# Patient Record
Sex: FEMALE | Race: WHITE
[De-identification: names, ages, dates, MRNs, and addresses within clinical notes are randomized per-mention and may not be internally consistent; named-entity substitution may affect disease eponyms.]

---

## 2019-09-11 ENCOUNTER — HOSPITAL ENCOUNTER (INPATIENT)
Dept: HOSPITAL 60 - LB.MS | Age: 84
LOS: 1 days | Discharge: OTHER INSTITUTION | DRG: 951 | End: 2019-09-12
Attending: FAMILY MEDICINE | Admitting: FAMILY MEDICINE
Payer: COMMERCIAL

## 2019-09-11 VITALS — HEART RATE: 81 BPM | DIASTOLIC BLOOD PRESSURE: 73 MMHG | SYSTOLIC BLOOD PRESSURE: 142 MMHG

## 2019-09-11 DIAGNOSIS — H54.8: ICD-10-CM

## 2019-09-11 DIAGNOSIS — Z79.82: ICD-10-CM

## 2019-09-11 DIAGNOSIS — Z98.49: ICD-10-CM

## 2019-09-11 DIAGNOSIS — M54.9: ICD-10-CM

## 2019-09-11 DIAGNOSIS — Z99.81: ICD-10-CM

## 2019-09-11 DIAGNOSIS — E78.00: ICD-10-CM

## 2019-09-11 DIAGNOSIS — K21.9: ICD-10-CM

## 2019-09-11 DIAGNOSIS — Z79.899: ICD-10-CM

## 2019-09-11 DIAGNOSIS — I10: ICD-10-CM

## 2019-09-11 DIAGNOSIS — Z98.890: ICD-10-CM

## 2019-09-11 DIAGNOSIS — M19.90: ICD-10-CM

## 2019-09-11 DIAGNOSIS — Z87.440: ICD-10-CM

## 2019-09-11 DIAGNOSIS — Z79.51: ICD-10-CM

## 2019-09-11 DIAGNOSIS — J44.9: ICD-10-CM

## 2019-09-11 DIAGNOSIS — Z88.0: ICD-10-CM

## 2019-09-11 DIAGNOSIS — Z41.8: Primary | ICD-10-CM

## 2019-09-11 DIAGNOSIS — G89.29: ICD-10-CM

## 2019-09-11 RX ADMIN — ACETAMINOPHEN SCH MG: 650 TABLET, FILM COATED, EXTENDED RELEASE ORAL at 19:57

## 2019-09-11 RX ADMIN — ALOGLIPTIN SCH EACH: 25 TABLET, FILM COATED ORAL at 19:57

## 2019-09-11 NOTE — PCM.HP.2
H&P History of Present Illness





- General


Date of Service: 09/11/19


Admit Problem/Dx: 


 Admission Diagnosis/Problem





Admission Diagnosis/Problem      Surgical procedure








Source of Information: Old Records





- History of Present Illness


Initial Comments - Free Text/Narative: 





This is a 84yo F here for respite care to help her in preparation of her 

colonoscopy. 





- Related Data


Allergies/Adverse Reactions: 


 Allergies











Allergy/AdvReac Type Severity Reaction Status Date / Time


 


amoxicillin Allergy  Cannot Verified 03/19/18 09:31





   Remember  


 


Penicillins Allergy  Cannot Verified 03/19/18 09:31





   Remember  











Home Medications: 


 Home Meds





Acetaminophen [Tylenol Arthritis] 2 tab PO BID 01/14/17 [History]


Albuterol [Ventolin HFA] 1 - 2 puff INH Q4HR PRN 01/14/17 [History]


Aspirin [Melchor Chewable Aspirin] 81 mg PO DAILY 01/14/17 [History]


Atenolol [Tenormin] 50 mg PO DAILY 01/14/17 [History]


Fluticasone Propionate [Flonase Allergy Relief] 1 - 2 spray NASBOTH DAILY 01/14/ 17 [History]


Fluticasone/Salmeterol [Advair 250-50 Diskus] 1 puff INH BID 01/14/17 [History]


Lidocaine 5% [Lidoderm 5%] 1 patch TOP DAILY 01/14/17 [History]


Montelukast Sodium [Singulair] 10 mg PO QPM 01/14/17 [History]


Pramipexole Di-HCl [Pramipexole Dihydrochloride] 0.125 mg PO QPM 01/14/17 [

History]


Tiotropium Bromide [Spiriva] 1 inh INH DAILY 01/14/17 [History]


atorvaSTATin Calcium [Atorvastatin Calcium] 10 mg PO QPM 01/14/17 [History]


Ranitidine HCl [Zantac] 300 mg PO DAILY 03/19/18 [History]


Fluticasone/Salmeterol [Advair 250-50 Diskus] 1 puff INH BID 03/21/18 [Rx]


Ondansetron [Zofran ODT] 4 mg PO Q6H PRN  tab.dis 03/21/18 [Rx]


Tiotropium [Spiriva HandiHaler] 18 mcg INH BEDTIME  cap 03/21/18 [Rx]











Past Medical History


HEENT History: Reports: Allergic Rhinitis, Cataract


Cardiovascular History: Reports: High Cholesterol, Hypertension


Other Cardiovascular History: 3cm aortic bulging.


Respiratory History: Reports: Asthma, COPD, Pneumonia, Recurrent


Gastrointestinal History: Reports: GERD, Hemorrhoids, Hiatal Hernia


Genitourinary History: Reports: UTI, Recurrent


OB/GYN History: Reports: Pregnancy


Musculoskeletal History: Reports: Arthritis, Back Pain, Chronic, Osteoarthritis





- Infectious Disease History


Infectious Disease History: Reports: C-Difficile, Chicken Pox, Measles, Mumps, 

RSV, Shingles





- Past Surgical History


HEENT Surgical History: Reports: Cataract Surgery, Radiocarotid Ectomy


Cardiovascular Surgical History: Reports: Carotid Endarterectomy





Social & Family History





- Family History


Family Medical History: Noncontributory


OBGYN: Reports: None


Neurological: Reports: None


Psychiatric: Reports: None


Endocrine/Metabolic: Reports: None


Hematologic: Reports: None


Oncologic: Reports: Breast





- Caffeine Use


Caffeine Use: Reports: Coffee, Soda





H&P Review of Systems





- Review of Systems:


Review Of Systems: ROS reveals no pertinent complaints other than HPI.





Exam





- Exam


Exam: See Below





- Exam


General: Alert


Lungs: Clear to Auscultation, Normal Respiratory Effort


Cardiovascular: Regular Rate, Regular Rhythm





- Problem List


(1) Colonoscopy planned


SNOMED Code(s): 964606548


   ICD Code: [id number] -    Status: Acute   Current Visit: Yes   


Problem List Initiated/Reviewed/Updated: Yes


Orders Last 24hrs: 


 Active Orders 24 hr











 Category Date Time Status


 


 Patient Status [ADT] Routine ADT  09/11/19 12:19 Active


 


 Acetaminophen [Tylenol Arthritis Pain] Med  09/11/19 20:00 Active





 1,300 mg PO BID   


 


 Albuterol [Ventolin HFA] Med  09/11/19 13:53 Active





 8 gm INH Q4H PRN   


 


 Aspirin [Halfprin] Med  09/12/19 08:00 Active





 81 mg PO DAILY   


 


 Atenolol [Tenormin] Med  09/12/19 08:00 Active





 25 mg PO DAILY   


 


 Ciprofloxacin [Ciprofloxacin HCl] Med  09/12/19 08:00 Active





 125 mg PO DAILY   


 


 Non-Formulary Medication [NF Drug] Med  09/11/19 20:00 Active





 1 each INH BID   


 


 Omeprazole Med  09/12/19 08:00 Active





 20 mg PO DAILY   


 


 Tiotropium [Spiriva HandiHaler] Med  09/12/19 08:00 Active





 18 mcg INH DAILY   








 Medication Orders





Acetaminophen (Tylenol Arthritis Pain)  1,300 mg PO BID CORNEL


Albuterol (Ventolin Hfa)  8 gm INH Q4H PRN


   PRN Reason: Shortness of Breath


Aspirin (Halfprin)  81 mg PO DAILY CORNEL


Atenolol (Tenormin)  25 mg PO DAILY CORNEL


Ciprofloxacin (Ciprofloxacin Hcl)  125 mg PO DAILY CORNEL


Advair Diskus 100/50 (Inhaler)  1 each INH BID CORNEL


Omeprazole (Omeprazole)  20 mg PO DAILY CORNEL


Tiotropium Bromide (Spiriva Handihaler)  18 mcg INH DAILY CORNEL








Assessment/Plan Comment:: 





Patient to be assisted for prep for colonoscopy.

## 2019-09-12 ENCOUNTER — HOSPITAL ENCOUNTER (OUTPATIENT)
Dept: HOSPITAL 60 - LB.SDS | Age: 84
Discharge: HOME | End: 2019-09-12
Attending: SURGERY
Payer: MEDICARE

## 2019-09-12 VITALS — SYSTOLIC BLOOD PRESSURE: 140 MMHG | HEART RATE: 99 BPM | DIASTOLIC BLOOD PRESSURE: 64 MMHG

## 2019-09-12 DIAGNOSIS — I10: ICD-10-CM

## 2019-09-12 DIAGNOSIS — K57.30: ICD-10-CM

## 2019-09-12 DIAGNOSIS — Z88.0: ICD-10-CM

## 2019-09-12 DIAGNOSIS — K63.5: ICD-10-CM

## 2019-09-12 DIAGNOSIS — Z12.11: Primary | ICD-10-CM

## 2019-09-12 DIAGNOSIS — D12.5: ICD-10-CM

## 2019-09-12 DIAGNOSIS — Z86.010: ICD-10-CM

## 2019-09-12 DIAGNOSIS — J44.9: ICD-10-CM

## 2019-09-12 PROCEDURE — G0121 COLON CA SCRN NOT HI RSK IND: HCPCS

## 2019-09-12 NOTE — OR
DATE OF OPERATION:  09/12/2019

 

SURGEON:  Maycol Aldana MD

 

PREOPERATIVE DIAGNOSIS:  Colon polyps.

 

POSTOPERATIVE DIAGNOSIS:  Colon polyps.

 

PROCEDURE:  Colonoscopy with polypectomy x2.

 

ANESTHESIA:  MAC.

 

ESTIMATED BLOOD LOSS:  Minimal.

 

COMPLICATIONS:  None.

 

INDICATION FOR THE PROCEDURE:  The patient is an 85-year-old female who 4 years ago was

scoped for C diff.  She was found to have polyps at that time.  No polyps were removed due

to her active infection that was 4 years ago.  She is here today for her first colonoscopy

since then for the recommended removal of those polyps.  She otherwise denies any change in

bowel habits since then.

 

DESCRIPTION OF PROCEDURE:  Informed consent was obtained from the patient.  The patient was

taken to the operating room, placed on table in left lateral decubitus position.  Monitored

anesthesia care was administered.  Digital rectal exam performed and was normal.

Colonoscope then advanced through the anus, directed toward the cecum.  Cecum was reached

and identified by appendiceal orifice and ileocecal valve.  She did have a small sessile

polyp in the proximal sigmoid colon and this was removed with cold biopsy forceps.  She had

a second small semi-pedunculated polyp in the distal sigmoid colon.  This was removed with

hot snare cautery.  Colonoscope then withdrawn slowly.  She does some moderate sigmoid

diverticulosis present as well.  Otherwise, colon was desufflated and colonoscope removed.

 

FINDINGS:  Sigmoid colon polyps x2.

 

RECOMMENDATIONS:  We will follow up on pathology.  Nothing concerning here from cancer

standpoint.  Would not recommend any further colonoscopies due to age.

 

 

MN/LETTY

DD:  09/12/2019 15:09:11

DT:  09/12/2019 21:28:03

Job #:  [id number]

## 2019-09-13 NOTE — [id number]
**Discharge Summary





- Discharge Data


Discharge Date: 09/12/19


Discharge Disposition: DC/Tfer to Other 70


Condition: Good





- Referral to Home Health


Primary Care Physician: 


PCP None








- Discharge Diagnosis/Problem(s)


(1) Colonoscopy planned


SNOMED Code(s): 483861551


   ICD Code: [id number] -    Status: Acute   





- Discharge Plan


Home Medications: 


 Home Meds





Acetaminophen [Tylenol Arthritis] 2 tab PO BID 01/14/17 [History]


Albuterol [Ventolin HFA] 1 - 2 puff INH Q4HR PRN 01/14/17 [History]


Aspirin [Melchor Chewable Aspirin] 81 mg PO DAILY 01/14/17 [History]


Atenolol [Tenormin] 25 mg PO DAILY 01/14/17 [History]


Fluticasone/Salmeterol [Advair 250-50 Diskus] 1 puff INH BID 01/14/17 [History]


Tiotropium Bromide [Spiriva] 1 inh INH DAILY 01/14/17 [History]


Ciprofloxacin [Ciprofloxacin HCl] 250 mg PO DAILY 09/11/19 [History]


Omeprazole 20 mg PO DAILY 09/11/19 [History]











- Discharge Summary/Plan Comment


DC Time >30 min.: No


Discharge Summary/Plan Comment: 





Patient discharged to same day surgery. 





- Patient Data


Vitals - Most Recent: 


 Last Vital Signs











Temp  36.4 C   09/11/19 19:59


 


Pulse  81   09/11/19 19:59


 


Resp  18   09/11/19 19:59


 


BP  142/73 H  09/11/19 19:59


 


Pulse Ox  97   09/11/19 19:59











Weight - Most Recent: 75.296 kg


Med Orders - Current: 


 Current Medications








Discontinued Medications





Acetaminophen (Tylenol Arthritis Pain)  1,300 mg PO BID CORNEL


   Last Admin: 09/11/19 19:57 Dose:  1,300 mg


Albuterol (Ventolin Hfa)  8 gm INH Q4H PRN


   PRN Reason: Shortness of Breath


Aspirin (Halfprin)  81 mg PO DAILY CORNEL


Atenolol (Tenormin)  25 mg PO DAILY CORNEL


Ciprofloxacin (Ciprofloxacin Hcl)  125 mg PO DAILY CORNEL


Advair Diskus 100/50 (Inhaler)  1 each INH BID CORNEL


   Last Admin: 09/11/19 19:57 Dose:  1 each


Omeprazole (Omeprazole)  20 mg PO DAILY CORNEL


Tiotropium Bromide (Spiriva Handihaler)  18 mcg INH DAILY CORNEL